# Patient Record
Sex: FEMALE | Race: WHITE
[De-identification: names, ages, dates, MRNs, and addresses within clinical notes are randomized per-mention and may not be internally consistent; named-entity substitution may affect disease eponyms.]

---
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
## 2022-01-18 ENCOUNTER — APPOINTMENT (OUTPATIENT)
Dept: OBGYN | Facility: CLINIC | Age: 22
End: 2022-01-18
Payer: COMMERCIAL

## 2022-01-18 VITALS
WEIGHT: 177 LBS | BODY MASS INDEX: 28.45 KG/M2 | SYSTOLIC BLOOD PRESSURE: 100 MMHG | DIASTOLIC BLOOD PRESSURE: 68 MMHG | HEIGHT: 66 IN

## 2022-01-18 PROCEDURE — 99213 OFFICE O/P EST LOW 20 MIN: CPT

## 2022-08-16 ENCOUNTER — NON-APPOINTMENT (OUTPATIENT)
Age: 22
End: 2022-08-16

## 2022-08-19 ENCOUNTER — APPOINTMENT (OUTPATIENT)
Dept: OPHTHALMOLOGY | Facility: CLINIC | Age: 22
End: 2022-08-19

## 2022-08-19 ENCOUNTER — NON-APPOINTMENT (OUTPATIENT)
Age: 22
End: 2022-08-19

## 2022-08-19 PROCEDURE — 92083 EXTENDED VISUAL FIELD XM: CPT

## 2022-08-19 PROCEDURE — 99204 OFFICE O/P NEW MOD 45 MIN: CPT

## 2022-08-19 PROCEDURE — 92133 CPTRZD OPH DX IMG PST SGM ON: CPT

## 2022-09-12 ENCOUNTER — APPOINTMENT (OUTPATIENT)
Dept: OBGYN | Facility: CLINIC | Age: 22
End: 2022-09-12

## 2022-09-12 DIAGNOSIS — N94.6 DYSMENORRHEA, UNSPECIFIED: ICD-10-CM

## 2022-09-12 PROCEDURE — 99213 OFFICE O/P EST LOW 20 MIN: CPT | Mod: 95

## 2022-09-14 NOTE — HISTORY OF PRESENT ILLNESS
[FreeTextEntry1] : Patient at college\par Myself in [place] office.\par Discussed telehealth visit and insurance to be billed.\par \par Heavy painful menses controlled with ocp.  Wishes to continue.\par

## 2022-12-30 ENCOUNTER — APPOINTMENT (OUTPATIENT)
Dept: OBGYN | Facility: CLINIC | Age: 22
End: 2022-12-30
Payer: COMMERCIAL

## 2022-12-30 VITALS
BODY MASS INDEX: 30.53 KG/M2 | SYSTOLIC BLOOD PRESSURE: 100 MMHG | DIASTOLIC BLOOD PRESSURE: 70 MMHG | WEIGHT: 190 LBS | HEIGHT: 66 IN

## 2022-12-30 DIAGNOSIS — B37.31 ACUTE CANDIDIASIS OF VULVA AND VAGINA: ICD-10-CM

## 2022-12-30 PROCEDURE — 99213 OFFICE O/P EST LOW 20 MIN: CPT

## 2022-12-30 NOTE — HISTORY OF PRESENT ILLNESS
[FreeTextEntry1] : Partner cheated 3 weeks ago.  Desires sti screen.  C/o white curdy discharge and itching x 1 week.

## 2022-12-30 NOTE — PHYSICAL EXAM
[Labia Majora] : normal [Labia Minora] : normal [Discharge] : a  ~M vaginal discharge was present [Moderate] : moderate [White] : white [Cheesy] : cheesy [Normal] : normal [Uterine Adnexae] : normal

## 2022-12-31 LAB
C TRACH RRNA SPEC QL NAA+PROBE: NOT DETECTED
CANDIDA VAG CYTO: NOT DETECTED
G VAGINALIS+PREV SP MTYP VAG QL MICRO: NOT DETECTED
HBV SURFACE AG SER QL: NONREACTIVE
HCV AB SER QL: NONREACTIVE
HCV S/CO RATIO: 0.11 S/CO
HIV1+2 AB SPEC QL IA.RAPID: NONREACTIVE
N GONORRHOEA RRNA SPEC QL NAA+PROBE: NOT DETECTED
SOURCE AMPLIFICATION: NORMAL
SOURCE TP AMPLIFICATION: NORMAL
T PALLIDUM AB SER QL IA: NEGATIVE
T VAGINALIS RRNA SPEC QL NAA+PROBE: NOT DETECTED
T VAGINALIS VAG QL WET PREP: NOT DETECTED

## 2023-01-03 ENCOUNTER — NON-APPOINTMENT (OUTPATIENT)
Age: 23
End: 2023-01-03

## 2023-01-06 ENCOUNTER — RX RENEWAL (OUTPATIENT)
Age: 23
End: 2023-01-06

## 2023-03-13 ENCOUNTER — APPOINTMENT (OUTPATIENT)
Dept: OBGYN | Facility: CLINIC | Age: 23
End: 2023-03-13
Payer: COMMERCIAL

## 2023-03-13 VITALS
WEIGHT: 192 LBS | HEIGHT: 66 IN | SYSTOLIC BLOOD PRESSURE: 120 MMHG | BODY MASS INDEX: 30.86 KG/M2 | DIASTOLIC BLOOD PRESSURE: 72 MMHG

## 2023-03-13 DIAGNOSIS — Z13.1 ENCOUNTER FOR SCREENING FOR DIABETES MELLITUS: ICD-10-CM

## 2023-03-13 DIAGNOSIS — Z01.419 ENCOUNTER FOR GYNECOLOGICAL EXAMINATION (GENERAL) (ROUTINE) W/OUT ABNORMAL FINDINGS: ICD-10-CM

## 2023-03-13 DIAGNOSIS — Z30.9 ENCOUNTER FOR CONTRACEPTIVE MANAGEMENT, UNSPECIFIED: ICD-10-CM

## 2023-03-13 DIAGNOSIS — Z11.3 ENCOUNTER FOR SCREENING FOR INFECTIONS WITH A PREDOMINANTLY SEXUAL MODE OF TRANSMISSION: ICD-10-CM

## 2023-03-13 PROCEDURE — 99395 PREV VISIT EST AGE 18-39: CPT

## 2023-03-13 RX ORDER — FLUCONAZOLE 200 MG/1
200 TABLET ORAL
Qty: 1 | Refills: 0 | Status: ACTIVE | COMMUNITY
Start: 2023-03-13 | End: 1900-01-01

## 2023-03-13 RX ORDER — CLOTRIMAZOLE 10 MG/G
1 CREAM TOPICAL
Qty: 1 | Refills: 0 | Status: ACTIVE | COMMUNITY
Start: 2023-03-13 | End: 1900-01-01

## 2023-03-13 RX ORDER — NORETHINDRONE ACETATE AND ETHINYL ESTRADIOL AND FERROUS FUMARATE 1MG-20(21)
1-20 KIT ORAL
Qty: 3 | Refills: 1 | Status: ACTIVE | OUTPATIENT
Start: 2019-07-29

## 2023-03-13 NOTE — DISCUSSION/SUMMARY
[FreeTextEntry1] : Diabetes screening \par Avoid douching, sugary foods, constrictive clothing, perfumed feminine products\par Keep area clean and dry\par Change pads and tampons every 4-8 hours\par Use mild unscented soap or plain water to clean vagina at least once daily\par Wear cotton underwear\par Wipe carefully after bowel movements\par RTO in 6 months or PRN

## 2023-03-13 NOTE — PHYSICAL EXAM
[Appropriately responsive] : appropriately responsive [Alert] : alert [No Acute Distress] : no acute distress [Soft] : soft [Non-tender] : non-tender [Examination Of The Breasts] : a normal appearance [No Masses] : no breast masses were palpable [Labia Majora Erythema] : erythema [Labia Minora Erythema] : erythema [Discharge] : a  ~M vaginal discharge was present [White] : white [Curds] : curd-like [Normal] : normal [Uterine Adnexae] : normal

## 2023-03-13 NOTE — HISTORY OF PRESENT ILLNESS
[N] : Patient reports normal menses [Oral Contraceptive] : uses oral contraception pills [Y] : Patient is sexually active [Monogamous (Male Partner)] : is monogamous with a male partner [Patient would like to be screened for STIs] : Patient would like to be screened for STIs [FreeTextEntry1] : 23 yo presents for annual exam with complaint of vaginal irritation, rash and discharge.  Desires refill of OCP's. [LMPDate] : 2/15/23

## 2023-03-15 LAB
ALBUMIN SERPL ELPH-MCNC: 4.6 G/DL
ALP BLD-CCNC: 65 U/L
ALT SERPL-CCNC: 16 U/L
ANION GAP SERPL CALC-SCNC: 16 MMOL/L
AST SERPL-CCNC: 15 U/L
BILIRUB SERPL-MCNC: 0.4 MG/DL
BUN SERPL-MCNC: 5 MG/DL
C TRACH RRNA SPEC QL NAA+PROBE: NOT DETECTED
CALCIUM SERPL-MCNC: 9.9 MG/DL
CANDIDA VAG CYTO: DETECTED
CHLORIDE SERPL-SCNC: 104 MMOL/L
CO2 SERPL-SCNC: 20 MMOL/L
CREAT SERPL-MCNC: 0.71 MG/DL
EGFR: 125 ML/MIN/1.73M2
ESTIMATED AVERAGE GLUCOSE: 94 MG/DL
G VAGINALIS+PREV SP MTYP VAG QL MICRO: NOT DETECTED
GLUCOSE SERPL-MCNC: 102 MG/DL
HBA1C MFR BLD HPLC: 4.9 %
HBV SURFACE AG SER QL: NONREACTIVE
HCV AB SER QL: NONREACTIVE
HCV S/CO RATIO: 0.09 S/CO
HIV1+2 AB SPEC QL IA.RAPID: NONREACTIVE
N GONORRHOEA RRNA SPEC QL NAA+PROBE: NOT DETECTED
POTASSIUM SERPL-SCNC: 4 MMOL/L
PROT SERPL-MCNC: 7.5 G/DL
SODIUM SERPL-SCNC: 139 MMOL/L
SOURCE AMPLIFICATION: NORMAL
SOURCE AMPLIFICATION: NORMAL
T PALLIDUM AB SER QL IA: NEGATIVE
T VAGINALIS RRNA SPEC QL NAA+PROBE: NOT DETECTED
T VAGINALIS VAG QL WET PREP: NOT DETECTED

## 2023-09-11 ENCOUNTER — APPOINTMENT (OUTPATIENT)
Dept: OBGYN | Facility: CLINIC | Age: 23
End: 2023-09-11